# Patient Record
Sex: MALE | Race: WHITE | NOT HISPANIC OR LATINO | Employment: FULL TIME | URBAN - NONMETROPOLITAN AREA
[De-identification: names, ages, dates, MRNs, and addresses within clinical notes are randomized per-mention and may not be internally consistent; named-entity substitution may affect disease eponyms.]

---

## 2023-02-19 PROBLEM — L25.9 DERMATITIS, CONTACT: Status: ACTIVE | Noted: 2023-02-19

## 2023-02-19 PROBLEM — E78.5 HYPERLIPIDEMIA: Status: ACTIVE | Noted: 2023-02-19

## 2023-02-19 PROBLEM — E78.1 HYPERTRIGLYCERIDEMIA: Status: ACTIVE | Noted: 2023-02-19

## 2023-02-19 PROBLEM — M10.9 GOUT: Status: ACTIVE | Noted: 2023-02-19

## 2023-02-19 PROBLEM — R03.0 PREHYPERTENSION: Status: ACTIVE | Noted: 2023-02-19

## 2023-02-19 RX ORDER — ALLOPURINOL 300 MG/1
1 TABLET ORAL DAILY
COMMUNITY
Start: 2017-03-07 | End: 2024-03-07

## 2023-02-19 RX ORDER — INDOMETHACIN 50 MG/1
1 CAPSULE ORAL
COMMUNITY
Start: 2017-03-07 | End: 2023-03-15 | Stop reason: SDUPTHER

## 2023-03-10 ENCOUNTER — APPOINTMENT (OUTPATIENT)
Dept: PRIMARY CARE | Facility: CLINIC | Age: 38
End: 2023-03-10
Payer: COMMERCIAL

## 2023-03-15 ENCOUNTER — OFFICE VISIT (OUTPATIENT)
Dept: PRIMARY CARE | Facility: CLINIC | Age: 38
End: 2023-03-15
Payer: COMMERCIAL

## 2023-03-15 VITALS
OXYGEN SATURATION: 95 % | RESPIRATION RATE: 10 BRPM | WEIGHT: 262 LBS | TEMPERATURE: 97.6 F | BODY MASS INDEX: 33.62 KG/M2 | DIASTOLIC BLOOD PRESSURE: 84 MMHG | SYSTOLIC BLOOD PRESSURE: 148 MMHG | HEIGHT: 74 IN | HEART RATE: 79 BPM

## 2023-03-15 DIAGNOSIS — E78.2 MIXED HYPERLIPIDEMIA: ICD-10-CM

## 2023-03-15 DIAGNOSIS — R03.0 PREHYPERTENSION: Primary | ICD-10-CM

## 2023-03-15 DIAGNOSIS — M10.9 GOUT, ARTHRITIS: ICD-10-CM

## 2023-03-15 PROCEDURE — 99214 OFFICE O/P EST MOD 30 MIN: CPT | Performed by: FAMILY MEDICINE

## 2023-03-15 PROCEDURE — 1036F TOBACCO NON-USER: CPT | Performed by: FAMILY MEDICINE

## 2023-03-15 RX ORDER — INDOMETHACIN 50 MG/1
50 CAPSULE ORAL
Qty: 90 CAPSULE | Refills: 3 | Status: SHIPPED | OUTPATIENT
Start: 2023-03-15

## 2023-03-15 ASSESSMENT — PAIN SCALES - GENERAL: PAINLEVEL: 0-NO PAIN

## 2023-03-15 NOTE — PATIENT INSTRUCTIONS
Recommend Measuring your blood pressure at home,  a couple times a week .  Goal BP is 125/80.    Fasting labwork ordered .     Contact me if your blood pressure is consistently elevated.     Followup 1 year

## 2023-03-15 NOTE — PROGRESS NOTES
Subjective   Joshua Bloom is a 37 y.o. male who presents for Med Follow up .  HPI  Followup of HL, Prehtn, gout.     Doing well in interval. Has been training for a triathalon later in the year.    Exercising regularly , has a .     No changes in pmhx, pshx, fmhx.     Review of Systems   Constitutional:  Negative for chills, fever and unexpected weight change.   HENT:  Negative for congestion, dental problem, sore throat and trouble swallowing.    Respiratory:  Negative for cough, shortness of breath and wheezing.    Cardiovascular:  Negative for chest pain, palpitations and leg swelling.   Gastrointestinal:  Negative for abdominal pain, blood in stool, constipation, diarrhea, nausea and vomiting.   Skin:  Negative for rash and wound.   Neurological:  Negative for dizziness and headaches.     Objective   Physical Exam  Vitals and nursing note reviewed.   Constitutional:       General: He is not in acute distress.     Appearance: Normal appearance.   Cardiovascular:      Heart sounds: Normal heart sounds.   Pulmonary:      Breath sounds: Normal breath sounds.   Abdominal:      General: Bowel sounds are normal.      Palpations: Abdomen is soft.   Musculoskeletal:         General: Normal range of motion.      Cervical back: Neck supple.   Neurological:      General: No focal deficit present.      Mental Status: He is alert and oriented to person, place, and time.         Assessment/Plan   Problem List Items Addressed This Visit       Hyperlipidemia    Relevant Orders    Lipid panel    Prehypertension - Primary    Relevant Medications    indomethacin (Indocin) 50 mg capsule    Other Relevant Orders    CBC and Auto Differential    Tsh With Reflex To Free T4 If Abnormal    Comprehensive metabolic panel    Follow Up In [facility] - PCP     Other Visit Diagnoses       Gout, arthritis        Relevant Orders    Uric acid              Katina Shetty MD

## 2024-03-07 DIAGNOSIS — M10.9 GOUT, UNSPECIFIED CAUSE, UNSPECIFIED CHRONICITY, UNSPECIFIED SITE: ICD-10-CM

## 2024-03-07 RX ORDER — ALLOPURINOL 300 MG/1
300 TABLET ORAL DAILY
Qty: 90 TABLET | Refills: 0 | Status: SHIPPED | OUTPATIENT
Start: 2024-03-07 | End: 2024-06-04 | Stop reason: SDUPTHER

## 2024-03-15 ENCOUNTER — APPOINTMENT (OUTPATIENT)
Dept: PRIMARY CARE | Facility: CLINIC | Age: 39
End: 2024-03-15
Payer: COMMERCIAL

## 2024-06-01 DIAGNOSIS — M10.9 GOUT, UNSPECIFIED CAUSE, UNSPECIFIED CHRONICITY, UNSPECIFIED SITE: ICD-10-CM

## 2024-06-03 RX ORDER — ALLOPURINOL 300 MG/1
300 TABLET ORAL DAILY
Qty: 90 TABLET | Refills: 0 | OUTPATIENT
Start: 2024-06-03

## 2024-06-04 DIAGNOSIS — M10.9 GOUT, UNSPECIFIED CAUSE, UNSPECIFIED CHRONICITY, UNSPECIFIED SITE: ICD-10-CM

## 2024-06-04 RX ORDER — ALLOPURINOL 300 MG/1
300 TABLET ORAL DAILY
Qty: 90 TABLET | Refills: 0 | Status: SHIPPED | OUTPATIENT
Start: 2024-06-04

## 2024-07-08 ENCOUNTER — APPOINTMENT (OUTPATIENT)
Dept: PRIMARY CARE | Facility: CLINIC | Age: 39
End: 2024-07-08
Payer: COMMERCIAL

## 2024-07-08 VITALS
BODY MASS INDEX: 34.65 KG/M2 | DIASTOLIC BLOOD PRESSURE: 92 MMHG | HEART RATE: 79 BPM | WEIGHT: 270 LBS | HEIGHT: 74 IN | OXYGEN SATURATION: 96 % | SYSTOLIC BLOOD PRESSURE: 150 MMHG

## 2024-07-08 DIAGNOSIS — E78.2 MIXED HYPERLIPIDEMIA: ICD-10-CM

## 2024-07-08 DIAGNOSIS — Z13.29 THYROID DISORDER SCREENING: ICD-10-CM

## 2024-07-08 DIAGNOSIS — R03.0 PREHYPERTENSION: Chronic | ICD-10-CM

## 2024-07-08 DIAGNOSIS — R73.9 HYPERGLYCEMIA: ICD-10-CM

## 2024-07-08 DIAGNOSIS — Z76.89 ENCOUNTER TO ESTABLISH CARE: Primary | ICD-10-CM

## 2024-07-08 DIAGNOSIS — M10.9 GOUT, UNSPECIFIED CAUSE, UNSPECIFIED CHRONICITY, UNSPECIFIED SITE: ICD-10-CM

## 2024-07-08 LAB
ALBUMIN SERPL BCP-MCNC: 4.6 G/DL (ref 3.4–5)
ALP SERPL-CCNC: 80 U/L (ref 33–120)
ALT SERPL W P-5'-P-CCNC: 25 U/L (ref 10–52)
ANION GAP SERPL CALC-SCNC: 16 MMOL/L (ref 10–20)
AST SERPL W P-5'-P-CCNC: 25 U/L (ref 9–39)
BASOPHILS # BLD AUTO: 0.04 X10*3/UL (ref 0–0.1)
BASOPHILS NFR BLD AUTO: 0.6 %
BILIRUB SERPL-MCNC: 0.7 MG/DL (ref 0–1.2)
BUN SERPL-MCNC: 15 MG/DL (ref 6–23)
CALCIUM SERPL-MCNC: 9.6 MG/DL (ref 8.6–10.6)
CHLORIDE SERPL-SCNC: 102 MMOL/L (ref 98–107)
CO2 SERPL-SCNC: 26 MMOL/L (ref 21–32)
CREAT SERPL-MCNC: 1.24 MG/DL (ref 0.5–1.3)
EGFRCR SERPLBLD CKD-EPI 2021: 77 ML/MIN/1.73M*2
EOSINOPHIL # BLD AUTO: 0.03 X10*3/UL (ref 0–0.7)
EOSINOPHIL NFR BLD AUTO: 0.5 %
ERYTHROCYTE [DISTWIDTH] IN BLOOD BY AUTOMATED COUNT: 13 % (ref 11.5–14.5)
EST. AVERAGE GLUCOSE BLD GHB EST-MCNC: 100 MG/DL
GLUCOSE SERPL-MCNC: 90 MG/DL (ref 74–99)
HBA1C MFR BLD: 5.1 %
HCT VFR BLD AUTO: 43.6 % (ref 41–52)
HGB BLD-MCNC: 14.2 G/DL (ref 13.5–17.5)
IMM GRANULOCYTES # BLD AUTO: 0.03 X10*3/UL (ref 0–0.7)
IMM GRANULOCYTES NFR BLD AUTO: 0.5 % (ref 0–0.9)
LYMPHOCYTES # BLD AUTO: 1.5 X10*3/UL (ref 1.2–4.8)
LYMPHOCYTES NFR BLD AUTO: 22.7 %
MCH RBC QN AUTO: 29.6 PG (ref 26–34)
MCHC RBC AUTO-ENTMCNC: 32.6 G/DL (ref 32–36)
MCV RBC AUTO: 91 FL (ref 80–100)
MONOCYTES # BLD AUTO: 0.46 X10*3/UL (ref 0.1–1)
MONOCYTES NFR BLD AUTO: 6.9 %
NEUTROPHILS # BLD AUTO: 4.56 X10*3/UL (ref 1.2–7.7)
NEUTROPHILS NFR BLD AUTO: 68.8 %
NRBC BLD-RTO: 0 /100 WBCS (ref 0–0)
PLATELET # BLD AUTO: 274 X10*3/UL (ref 150–450)
POTASSIUM SERPL-SCNC: 4.3 MMOL/L (ref 3.5–5.3)
PROT SERPL-MCNC: 6.8 G/DL (ref 6.4–8.2)
RBC # BLD AUTO: 4.79 X10*6/UL (ref 4.5–5.9)
SODIUM SERPL-SCNC: 140 MMOL/L (ref 136–145)
TSH SERPL-ACNC: 2.77 MIU/L (ref 0.44–3.98)
URATE SERPL-MCNC: 4.3 MG/DL (ref 4–7.5)
WBC # BLD AUTO: 6.6 X10*3/UL (ref 4.4–11.3)

## 2024-07-08 PROCEDURE — 36415 COLL VENOUS BLD VENIPUNCTURE: CPT

## 2024-07-08 PROCEDURE — 84550 ASSAY OF BLOOD/URIC ACID: CPT

## 2024-07-08 PROCEDURE — 1036F TOBACCO NON-USER: CPT | Performed by: STUDENT IN AN ORGANIZED HEALTH CARE EDUCATION/TRAINING PROGRAM

## 2024-07-08 PROCEDURE — 84443 ASSAY THYROID STIM HORMONE: CPT

## 2024-07-08 PROCEDURE — 99214 OFFICE O/P EST MOD 30 MIN: CPT | Performed by: STUDENT IN AN ORGANIZED HEALTH CARE EDUCATION/TRAINING PROGRAM

## 2024-07-08 PROCEDURE — 83036 HEMOGLOBIN GLYCOSYLATED A1C: CPT

## 2024-07-08 PROCEDURE — 85025 COMPLETE CBC W/AUTO DIFF WBC: CPT

## 2024-07-08 PROCEDURE — 80053 COMPREHEN METABOLIC PANEL: CPT

## 2024-07-08 RX ORDER — ALLOPURINOL 300 MG/1
450 TABLET ORAL DAILY
Qty: 45 TABLET | Refills: 3 | Status: SHIPPED | OUTPATIENT
Start: 2024-07-08

## 2024-07-08 RX ORDER — INDOMETHACIN 50 MG/1
50 CAPSULE ORAL
Qty: 90 CAPSULE | Refills: 3 | Status: SHIPPED | OUTPATIENT
Start: 2024-07-08

## 2024-07-08 ASSESSMENT — ENCOUNTER SYMPTOMS
FATIGUE: 0
WHEEZING: 0
DYSPHORIC MOOD: 0
SHORTNESS OF BREATH: 0
SLEEP DISTURBANCE: 0
LIGHT-HEADEDNESS: 0
DIFFICULTY URINATING: 0
DIZZINESS: 0
ABDOMINAL PAIN: 0
NERVOUS/ANXIOUS: 0
PALPITATIONS: 0
UNEXPECTED WEIGHT CHANGE: 0
DIARRHEA: 0
SINUS PRESSURE: 0
CHEST TIGHTNESS: 0
CONSTIPATION: 0
HEADACHES: 0

## 2024-07-08 ASSESSMENT — PATIENT HEALTH QUESTIONNAIRE - PHQ9
2. FEELING DOWN, DEPRESSED OR HOPELESS: NOT AT ALL
SUM OF ALL RESPONSES TO PHQ9 QUESTIONS 1 AND 2: 0
1. LITTLE INTEREST OR PLEASURE IN DOING THINGS: NOT AT ALL

## 2024-07-08 NOTE — PROGRESS NOTES
Subjective   Patient ID: Joshua Bloom is a 39 y.o. male who presents for Establish Care (Would like to talk about allopurinol dosage - May need bloodwork done).  Establishing care. Overall doing well.     Hx of gout. Was taking 400mg allopurinol, then went down to 300mg daily. Had a flare up of gout about 1 month ago. Has been taking 450mg daily for last 2 weeks. States he initially tried to control his gout with just diet, was not effective. Last labs were about 2 years ago.     Exercises regularly. Training for a marathon in fall. Has previously done [place] marathon. Reports gets overheated sometimes when exercising. Otherwise no issues.     Does not check BP at home. Does not have cuff.     No problems with digestion.     Thinks a grandfather may have had colon cancer. No first degree relatives with prostate or colon cancer.     No other concerns today.         Review of Systems   Constitutional:  Negative for fatigue and unexpected weight change.   HENT:  Negative for congestion, ear pain and sinus pressure.    Eyes:  Negative for visual disturbance.   Respiratory:  Negative for chest tightness, shortness of breath and wheezing.    Cardiovascular:  Negative for chest pain, palpitations and leg swelling.   Gastrointestinal:  Negative for abdominal pain, constipation and diarrhea.   Genitourinary:  Negative for difficulty urinating.   Skin:  Negative for rash.   Neurological:  Negative for dizziness, light-headedness and headaches.   Psychiatric/Behavioral:  Negative for dysphoric mood and sleep disturbance. The patient is not nervous/anxious.    All other systems reviewed and are negative.      Objective   Physical Exam  Vitals reviewed.   Constitutional:       General: He is not in acute distress.     Appearance: Normal appearance.   HENT:      Head: Normocephalic.   Eyes:      Pupils: Pupils are equal, round, and reactive to light.   Cardiovascular:      Rate and Rhythm: Normal rate and regular rhythm.    Pulmonary:      Effort: Pulmonary effort is normal. No respiratory distress.   Musculoskeletal:         General: Normal range of motion.   Skin:     General: Skin is warm and dry.   Neurological:      Mental Status: He is alert. Mental status is at baseline.   Psychiatric:         Mood and Affect: Mood normal.         Behavior: Behavior normal.         Body mass index is 34.67 kg/m².      Current Outpatient Medications:     allopurinol (Zyloprim) 300 mg tablet, Take 1.5 tablets (450 mg) by mouth once daily., Disp: 45 tablet, Rfl: 3    indomethacin (Indocin) 50 mg capsule, Take 1 capsule (50 mg) by mouth 3 times daily (morning, midday, late afternoon)., Disp: 90 capsule, Rfl: 3      Assessment/Plan   Diagnoses and all orders for this visit:  Encounter to establish care  Gout, unspecified cause, unspecified chronicity, unspecified site  Comments:  currently taking 450mg daily  check uric acid level  Orders:  -     Uric Acid  -     allopurinol (Zyloprim) 300 mg tablet; Take 1.5 tablets (450 mg) by mouth once daily.  Prehypertension  Comments:  recommended getting a BP cuff and monitoring readings at home  Orders:  -     indomethacin (Indocin) 50 mg capsule; Take 1 capsule (50 mg) by mouth 3 times daily (morning, midday, late afternoon).  -     Comprehensive metabolic panel  -     CBC and Auto Differential  Thyroid disorder screening  -     TSH with reflex to Free T4 if abnormal  Mixed hyperlipidemia  Hyperglycemia  -     Hemoglobin A1c    Follow up for LAW Angel DO 07/08/24 2:33 PM

## 2024-07-29 DIAGNOSIS — M10.9 GOUT, UNSPECIFIED CAUSE, UNSPECIFIED CHRONICITY, UNSPECIFIED SITE: ICD-10-CM

## 2024-07-29 RX ORDER — ALLOPURINOL 300 MG/1
300 TABLET ORAL DAILY
Qty: 90 TABLET | Refills: 2 | Status: SHIPPED | OUTPATIENT
Start: 2024-07-29

## 2024-11-19 DIAGNOSIS — M10.9 GOUT, UNSPECIFIED CAUSE, UNSPECIFIED CHRONICITY, UNSPECIFIED SITE: ICD-10-CM

## 2024-11-19 RX ORDER — ALLOPURINOL 300 MG/1
TABLET ORAL
Qty: 135 TABLET | Refills: 1 | Status: SHIPPED | OUTPATIENT
Start: 2024-11-19

## 2025-05-20 DIAGNOSIS — M10.9 GOUT, UNSPECIFIED CAUSE, UNSPECIFIED CHRONICITY, UNSPECIFIED SITE: ICD-10-CM

## 2025-05-20 RX ORDER — ALLOPURINOL 300 MG/1
TABLET ORAL
Qty: 135 TABLET | Refills: 1 | Status: SHIPPED | OUTPATIENT
Start: 2025-05-20